# Patient Record
Sex: FEMALE | Race: WHITE | NOT HISPANIC OR LATINO | URBAN - METROPOLITAN AREA
[De-identification: names, ages, dates, MRNs, and addresses within clinical notes are randomized per-mention and may not be internally consistent; named-entity substitution may affect disease eponyms.]

---

## 2023-12-04 ENCOUNTER — APPOINTMENT (RX ONLY)
Dept: URBAN - METROPOLITAN AREA CLINIC 159 | Facility: CLINIC | Age: 58
Setting detail: DERMATOLOGY
End: 2023-12-04

## 2023-12-04 DIAGNOSIS — Z41.9 ENCOUNTER FOR PROCEDURE FOR PURPOSES OTHER THAN REMEDYING HEALTH STATE, UNSPECIFIED: ICD-10-CM

## 2023-12-04 PROCEDURE — ? FOREVER YOUNG BBL

## 2023-12-04 NOTE — PROCEDURE: FOREVER YOUNG BBL
Fluence (J/Cm2) - Will Not Render If 0: 0
Pulse Width Units: milliseconds
Anesthesia Type: 1% lidocaine with epinephrine
Consent: Written consent obtained.  Risks reviewed including but not limited to crusting, scabbing, blistering, scarring, darker or lighter pigmentary change, and incomplete clearance.
Spot Size: Finesse Adapter Size: 15 x 15 mm square
Filter: 560nm Filter
Filter: 515nm Filter
Price (Use Numbers Only, No Special Characters Or $): 500
Passes (Optional): 2
Add Setting 2?: no
Passes (Optional): 7
Procedure Note: After applying gel and applying protective eyeware, treatment was administered using the setting parameters listed above.
Technique: In-Motion
Spot Size: Finesse Adapter Size: 15 x 45 mm (No Finesse Adapter)
Detail Level: Zone
Post-Care Instructions: I reviewed with the patient in detail post-care instructions. Patient should avoid sun exposure before and after treatment.  Patient should wear sunscreen.
Add Setting 3?: yes

## 2025-01-02 ENCOUNTER — RX ONLY (RX ONLY)
Age: 60
End: 2025-01-02

## 2025-01-02 RX ORDER — VALACYCLOVIR HYDROCHLORIDE 1 G/1
TABLET, FILM COATED ORAL
Qty: 4 | Refills: 0 | Status: ERX | COMMUNITY
Start: 2025-01-02

## 2025-01-03 ENCOUNTER — APPOINTMENT (OUTPATIENT)
Dept: URBAN - METROPOLITAN AREA CLINIC 159 | Facility: CLINIC | Age: 60
Setting detail: DERMATOLOGY
End: 2025-01-03

## 2025-01-03 DIAGNOSIS — Z41.9 ENCOUNTER FOR PROCEDURE FOR PURPOSES OTHER THAN REMEDYING HEALTH STATE, UNSPECIFIED: ICD-10-CM

## 2025-01-03 PROCEDURE — ? FOREVER YOUNG BBL

## 2025-01-03 NOTE — PROCEDURE: FOREVER YOUNG BBL
Detail Level: Detailed
Irradiance ([place]/Cm2)- Will Not Render If 0: 0
Pulse Width Units: milliseconds
Temp (C): 25
Consent: Written consent obtained.  Risks reviewed including but not limited to crusting, scabbing, blistering, scarring, darker or lighter pigmentary change, and incomplete clearance.
Fluence (J/Cm2) - Will Not Render If 0: 5.5
Spot Size: Finesse Adapter Size: 15 x 15 mm square
Topical Anesthesia Type: 23% lidocaine, 7% tetracaine
Technique: In-Motion
Pulse Width - Will Not Render If 0: 3
Procedure Note: After applying gel and applying protective eyeware, treatment was administered using the setting parameters listed above.
Price (Use Numbers Only, No Special Characters Or $): 599
Post-Care Instructions: I reviewed with the patient in detail post-care instructions. Patient should avoid sun exposure before and after treatment.  Patient should wear sunscreen.
Add Setting 2?: no
Filter: 515nm Filter
Treatment Number: 1
Skin Type: II
Filter: 560nm Filter

## 2025-02-06 ENCOUNTER — RX ONLY (RX ONLY)
Age: 60
End: 2025-02-06

## 2025-02-06 RX ORDER — VALACYCLOVIR HYDROCHLORIDE 1 G/1
TABLET, FILM COATED ORAL
Qty: 30 | Refills: 1 | Status: ERX

## 2025-03-04 ENCOUNTER — APPOINTMENT (OUTPATIENT)
Dept: URBAN - METROPOLITAN AREA CLINIC 159 | Facility: CLINIC | Age: 60
Setting detail: DERMATOLOGY
End: 2025-03-04

## 2025-03-04 DIAGNOSIS — Z41.9 ENCOUNTER FOR PROCEDURE FOR PURPOSES OTHER THAN REMEDYING HEALTH STATE, UNSPECIFIED: ICD-10-CM

## 2025-03-04 PROCEDURE — ? FOREVER YOUNG BBL

## 2025-03-04 NOTE — PROCEDURE: FOREVER YOUNG BBL
Skin Type: II
Topical Anesthesia Type: 23% lidocaine, 7% tetracaine
Spot Size: Finesse Adapter Size: 15 x 15 mm square
Consent: Written consent obtained.  Risks reviewed including but not limited to crusting, scabbing, blistering, scarring, darker or lighter pigmentary change, and incomplete clearance.
Technique: In-Motion
Fluence (J/Cm2) - Will Not Render If 0: 0
Detail Level: Detailed
Add Setting 3?: no
Procedure Note: After applying gel and applying protective eyeware, treatment was administered using the setting parameters listed above.
Temp (C): 25
Pulse Width Units: milliseconds
Post-Care Instructions: I reviewed with the patient in detail post-care instructions. Patient should avoid sun exposure before and after treatment.  Patient should wear sunscreen.
Filter: 515nm Filter
Price (Use Numbers Only, No Special Characters Or $): 300
Number Of Prepaid Treatments (Will Not Render If 0): 2
Filter: 560nm Filter

## 2025-03-05 ENCOUNTER — APPOINTMENT (OUTPATIENT)
Dept: URBAN - METROPOLITAN AREA CLINIC 159 | Facility: CLINIC | Age: 60
Setting detail: DERMATOLOGY
End: 2025-03-05

## 2025-03-05 DIAGNOSIS — D18.0 HEMANGIOMA: ICD-10-CM

## 2025-03-05 DIAGNOSIS — L57.0 ACTINIC KERATOSIS: ICD-10-CM

## 2025-03-05 DIAGNOSIS — L82.1 OTHER SEBORRHEIC KERATOSIS: ICD-10-CM

## 2025-03-05 DIAGNOSIS — D22 MELANOCYTIC NEVI: ICD-10-CM

## 2025-03-05 DIAGNOSIS — L81.4 OTHER MELANIN HYPERPIGMENTATION: ICD-10-CM

## 2025-03-05 DIAGNOSIS — B00.1 HERPESVIRAL VESICULAR DERMATITIS: ICD-10-CM | Status: INADEQUATELY CONTROLLED

## 2025-03-05 DIAGNOSIS — Z71.89 OTHER SPECIFIED COUNSELING: ICD-10-CM

## 2025-03-05 PROBLEM — D22.5 MELANOCYTIC NEVI OF TRUNK: Status: ACTIVE | Noted: 2025-03-05

## 2025-03-05 PROBLEM — D18.01 HEMANGIOMA OF SKIN AND SUBCUTANEOUS TISSUE: Status: ACTIVE | Noted: 2025-03-05

## 2025-03-05 PROCEDURE — 17000 DESTRUCT PREMALG LESION: CPT

## 2025-03-05 PROCEDURE — ? LIQUID NITROGEN

## 2025-03-05 PROCEDURE — 99214 OFFICE O/P EST MOD 30 MIN: CPT | Mod: 25

## 2025-03-05 PROCEDURE — ? COUNSELING

## 2025-03-05 PROCEDURE — ? PRESCRIPTION

## 2025-03-05 RX ORDER — VALACYCLOVIR 500 MG/1
TABLET, FILM COATED ORAL
Qty: 90 | Refills: 3 | Status: ERX | COMMUNITY
Start: 2025-03-05

## 2025-03-05 RX ADMIN — VALACYCLOVIR: 500 TABLET, FILM COATED ORAL at 00:00

## 2025-03-05 ASSESSMENT — LOCATION SIMPLE DESCRIPTION DERM
LOCATION SIMPLE: LEFT UPPER BACK
LOCATION SIMPLE: LEFT LIP
LOCATION SIMPLE: RIGHT THIGH

## 2025-03-05 ASSESSMENT — LOCATION DETAILED DESCRIPTION DERM
LOCATION DETAILED: RIGHT ANTERIOR PROXIMAL THIGH
LOCATION DETAILED: LEFT INFERIOR UPPER BACK
LOCATION DETAILED: LEFT MEDIAL UPPER BACK
LOCATION DETAILED: LEFT SUPERIOR VERMILION LIP

## 2025-03-05 ASSESSMENT — LOCATION ZONE DERM
LOCATION ZONE: LEG
LOCATION ZONE: LIP
LOCATION ZONE: TRUNK

## 2025-03-05 NOTE — PROCEDURE: LIQUID NITROGEN
Duration Of Freeze Thaw-Cycle (Seconds): 0
Detail Level: Detailed
Show Applicator Variable?: Yes
Consent: The patient's consent was obtained including but not limited to risks of crusting, scabbing, blistering, scarring, darker or lighter pigmentary change, recurrence, incomplete removal and infection.
Render Note In Bullet Format When Appropriate: No
Application Tool (Optional): Liquid Nitrogen Sprayer
Post-Care Instructions: I reviewed with the patient in detail post-care instructions. Patient is to wear sunprotection, and avoid picking at any of the treated lesions. Pt may apply Vaseline to crusted or scabbing areas.

## 2025-05-05 ENCOUNTER — APPOINTMENT (OUTPATIENT)
Dept: URBAN - METROPOLITAN AREA CLINIC 159 | Facility: CLINIC | Age: 60
Setting detail: DERMATOLOGY
End: 2025-05-05

## 2025-05-05 DIAGNOSIS — L28.1 PRURIGO NODULARIS: ICD-10-CM

## 2025-05-05 PROCEDURE — ? OTC TREATMENT REGIMEN

## 2025-05-05 PROCEDURE — ? SEPARATE AND IDENTIFIABLE DOCUMENTATION

## 2025-05-05 PROCEDURE — 11900 INJECT SKIN LESIONS </W 7: CPT

## 2025-05-05 PROCEDURE — ? PRESCRIPTION

## 2025-05-05 PROCEDURE — 99213 OFFICE O/P EST LOW 20 MIN: CPT | Mod: 25

## 2025-05-05 PROCEDURE — ? COUNSELING

## 2025-05-05 PROCEDURE — ? INTRALESIONAL KENALOG

## 2025-05-05 RX ORDER — CLINDAMYCIN PHOSPHATE 10 MG/ML
SOLUTION TOPICAL
Qty: 60 | Refills: 3 | Status: ERX | COMMUNITY
Start: 2025-05-05

## 2025-05-05 RX ADMIN — CLINDAMYCIN PHOSPHATE: 10 SOLUTION TOPICAL at 00:00

## 2025-05-05 ASSESSMENT — LOCATION ZONE DERM
LOCATION ZONE: SCALP
LOCATION ZONE: NECK

## 2025-05-05 ASSESSMENT — LOCATION DETAILED DESCRIPTION DERM
LOCATION DETAILED: RIGHT SUPERIOR LATERAL NECK
LOCATION DETAILED: POSTERIOR MID-PARIETAL SCALP

## 2025-05-05 ASSESSMENT — LOCATION SIMPLE DESCRIPTION DERM
LOCATION SIMPLE: NECK
LOCATION SIMPLE: POSTERIOR SCALP

## 2025-05-05 NOTE — PROCEDURE: INTRALESIONAL KENALOG
Validate Note Data When Using Inventory: Yes
Require Ndc Code?: No
Medical Necessity Clause: This procedure was medically necessary because the lesions that were treated were:
Administered By (Optional): ramon
X Size Of Lesion In Cm (Optional): 0
Kenalog Preparation: Kenalog
Concentration Of Kenalog Solution Injected (Mg/Ml): 5.0
Total Volume (Ccs): 0.2
Detail Level: Detailed
Consent: The risks of atrophy were reviewed with the patient.
Kenalog Type Of Vial: Multiple Dose
Show Inventory Tab: Hide

## 2025-05-05 NOTE — PROCEDURE: COUNSELING
Patient Specific Counseling (Will Not Stick From Patient To Patient): ** advised manipulation of areas will perpetuate lesions.
Detail Level: Detailed

## 2025-05-05 NOTE — HPI: RASH
Is This A New Presentation, Or A Follow-Up?: Rash
How Severe Is Your Rash?: mild
Additional History: Pt has used nizoral shampoo for 3 days.

## 2025-05-05 NOTE — PROCEDURE: OTC TREATMENT REGIMEN
Patient Specific Otc Recommendations (Will Not Stick From Patient To Patient): CLN sport wash shampoo and N-acetylcycteine 600mg one tablet twice daily for 2 weeks. Can increase to 2 tabs daily if not effective.
Detail Level: Zone

## 2025-05-06 ENCOUNTER — APPOINTMENT (OUTPATIENT)
Dept: URBAN - METROPOLITAN AREA CLINIC 159 | Facility: CLINIC | Age: 60
Setting detail: DERMATOLOGY
End: 2025-05-06

## 2025-05-06 DIAGNOSIS — Z41.9 ENCOUNTER FOR PROCEDURE FOR PURPOSES OTHER THAN REMEDYING HEALTH STATE, UNSPECIFIED: ICD-10-CM

## 2025-05-06 PROCEDURE — ? SCITON MOXI

## 2025-05-06 NOTE — PROCEDURE: SCITON MOXI
Consent: Written consent obtained.  Risks were reviewed including but not limited to crusting, scabbing, blistering, scarring, darker or lighter pigmentary change, incomplete improvement, prolonged erythema and facial swelling, infection, and bleeding.
Level 3
Laser Type: Fractionated 1927nm, thulium laser
Number Of Passes: 5
Detail Level: Detailed
Energy (J/Cm2): 15
Price (Use Numbers Only, No Special Characters Or $): 1100
Treatment Number: 1
Pre=procedure Text: After consent was obtained, the treatment areas were cleaned and treated using the parameters noted above.
Post-Care Instructions: I reviewed with the patient in detail post-care instructions.  Recommended diligent sun protection and sun avoidance.

## 2025-06-30 ENCOUNTER — APPOINTMENT (OUTPATIENT)
Dept: URBAN - METROPOLITAN AREA CLINIC 159 | Facility: CLINIC | Age: 60
Setting detail: DERMATOLOGY
End: 2025-06-30

## 2025-06-30 DIAGNOSIS — Z41.9 ENCOUNTER FOR PROCEDURE FOR PURPOSES OTHER THAN REMEDYING HEALTH STATE, UNSPECIFIED: ICD-10-CM

## 2025-06-30 PROCEDURE — ? SCITON MOXI

## 2025-06-30 NOTE — PROCEDURE: SCITON MOXI
Price (Use Numbers Only, No Special Characters Or $): 400
Post-Care Instructions: I reviewed with the patient in detail post-care instructions.  Recommended diligent sun protection and sun avoidance.
Pre=procedure Text: After consent was obtained, the treatment areas were cleaned and treated using the parameters noted above.
Treatment Number: 1
Detail Level: Detailed
Total Coverage (%): 20
Consent: Written consent obtained.  Risks were reviewed including but not limited to crusting, scabbing, blistering, scarring, darker or lighter pigmentary change, incomplete improvement, prolonged erythema and facial swelling, infection, and bleeding.
Number Of Passes: 6
Laser Type: Fractionated 1927nm, thulium laser
Level 3